# Patient Record
Sex: MALE | Race: WHITE
[De-identification: names, ages, dates, MRNs, and addresses within clinical notes are randomized per-mention and may not be internally consistent; named-entity substitution may affect disease eponyms.]

---

## 2018-09-20 ENCOUNTER — HOSPITAL ENCOUNTER (EMERGENCY)
Dept: HOSPITAL 62 - ER | Age: 28
Discharge: HOME | End: 2018-09-20
Payer: SELF-PAY

## 2018-09-20 VITALS — DIASTOLIC BLOOD PRESSURE: 73 MMHG | SYSTOLIC BLOOD PRESSURE: 121 MMHG

## 2018-09-20 DIAGNOSIS — L23.7: Primary | ICD-10-CM

## 2018-09-20 DIAGNOSIS — Z87.891: ICD-10-CM

## 2018-09-20 DIAGNOSIS — M79.604: ICD-10-CM

## 2018-09-20 DIAGNOSIS — J45.909: ICD-10-CM

## 2018-09-20 DIAGNOSIS — L03.115: ICD-10-CM

## 2018-09-20 PROCEDURE — 99283 EMERGENCY DEPT VISIT LOW MDM: CPT

## 2018-09-20 PROCEDURE — 96372 THER/PROPH/DIAG INJ SC/IM: CPT

## 2018-09-20 PROCEDURE — 96374 THER/PROPH/DIAG INJ IV PUSH: CPT

## 2018-09-20 NOTE — ER DOCUMENT REPORT
ED Extremity Problem, Lower





- General


Chief Complaint: Leg Pain


Stated Complaint: LEG PAIN


Time Seen by Provider: 09/20/18 11:38


Mode of Arrival: Ambulatory


Information source: Patient


Notes: 





Chief complaint: Right lower leg wound








History of complain:( obtained from----patient) 28 years old male who was being 

treated for poison ivy for the last 4 days with prednisolone, with a widespread 

rash, presents today with right lower leg rash which is oozing serosanguineous 

fluid and is warm and tender to touch.  Suspecting it is infected.


Denies any fever chills or other constitutional symptoms








Onset: Gradual


Duration: 45 days


Severity: Moderate


Quality: Itchy


Context: Poison ivy


Exacerbating factor and relieving factors: None











REVIEW OF SYSTEMS:


CONSTITUTIONAL :  Denies fever,  chills, or sweats.  Denies recent illness.


EENT:   Denies eye, ear, throat, or mouth pain or symptoms.  Denies nasal or 

sinus congestion or discharge.  Denies throat, tongue, or mouth swelling or 

difficulty swallowing.


CARDIOVASCULAR:  Denies chest pain.  Denies palpitations or racing or irregular 

heart beat.  Denies ankle edema.


RESPIRATORY:  Denies cough, cold, or chest congestion.  Denies shortness of 

breath, difficulty breathing, or wheezing.


GASTROINTESTINAL:  Denies  distention.  Denies nausea, vomiting, or diarrhea.  

Denies blood in vomitus, stools, or per rectum.  Denies black, tarry stools.  

Denies constipation. 


GENITOURINARY:  Denies difficulty urinating, painful urination, burning, 

frequency, blood in urine, or discharge.


FEMALE  GENITOURINARY:  Denies vaginal bleeding, heavy or abnormal periods, 

irregular periods.  Denies vaginal discharge or odor. 


MUSCULOSKELETAL:  Denies back or neck pain or stiffness.  Denies joint pain or 

swelling.


SKIN:   


HEMATOLOGIC :   Denies easy bruising or bleeding.


LYMPHATIC:  Denies swollen, enlarged glands.


NEUROLOGICAL:  Denies confusion or altered mental status.  Denies passing out 

or loss of consciousness.  Denies dizziness or lightheadedness.  Denies 

headache.  Denies weakness or paralysis or loss of use of either side.  Denies 

problems with gait or speech.  Denies sensory loss, numbness, or tingling.  

Denies seizures.


PSYCHIATRIC:  Denies anxiety or stress.  Denies depression, suicidal ideation, 

or homicidal ideation.





ALL OTHER SYSTEMS REVIEWED AND NEGATIVE.











PHYSICAL EXAMINATION:





GENERAL: Well-appearing, well-nourished and in no acute distress.  Morbid 

obesity





HEAD: Atraumatic, normocephalic.





EYES: Pupils equal round and reactive to light, extraocular movements intact, 

conjunctiva are normal.





ENT: Nares patent, oropharynx clear without exudates.  Moist mucous membranes.





NECK: Normal range of motion, supple without lymphadenopathy





LUNGS: Breath sounds clear to auscultation bilaterally and equal.  No wheezes 

rales or rhonchi.





HEART: Regular rate and rhythm without murmurs





ABDOMEN: Soft, nontender, nondistended abdomen.  No guarding, no rebound.  No 

masses appreciated.





Examination of genitals-deferred





Musculoskeletal: Normal range of motion, no pitting or edema.  No cyanosis.





NEUROLOGICAL: Cranial nerves grossly intact.  Normal speech, normal gait.  

Normal sensory, motor exams





PSYCH: Normal mood, normal affect.





SKIN: Diffuse scaly erythematous rash noted throughout the whole body in crops.

  Right lower leg lateral side of the area off 8" x 6" erythema is warm and 

tender to touch with oozing of serosanguineous fluid.

















Dictation was performed using Dragon voice recognition software


TRAVEL OUTSIDE OF THE [place] IN LAST 30 DAYS: No





- HPI


Notes: 





Dictated





- Related Data


Allergies/Adverse Reactions: 


 





No Known Allergies Allergy (Verified 09/20/18 11:09)


 











Past Medical History





- Social History


Smoking Status: Former Smoker


Chew tobacco use (# tins/day): No


Frequency of alcohol use: None


Drug Abuse: None


Family History: Reviewed & Not Pertinent


Patient has suicidal ideation: No


Patient has homicidal ideation: No


Pulmonary Medical History: Reports: Hx Asthma - as child


Renal/ Medical History: Denies: Hx Peritoneal Dialysis


Past Surgical History: Reports: Hx Tonsillectomy





- Immunizations


Hx Diphtheria, Pertussis, Tetanus Vaccination: Yes





Review of Systems





- Review of Systems


Notes: 





Dictated





Physical Exam





- Vital signs


Vitals: 





 











Temp Pulse Resp BP Pulse Ox


 


 97.9 F   99   15   121/73   97 


 


 09/20/18 11:14  09/20/18 11:14  09/20/18 11:14  09/20/18 11:14  09/20/18 11:14














- Notes


Notes: 





Dictated





Course





- Re-evaluation


Re-evalutation: 





09/20/18 11:47


Given Solu-Medrol, and ceftriaxone





- Vital Signs


Vital signs: 





 











Temp Pulse Resp BP Pulse Ox


 


 97.9 F   99   15   121/73   97 


 


 09/20/18 11:14  09/20/18 11:14  09/20/18 11:14  09/20/18 11:14  09/20/18 11:14














Discharge





- Discharge


Clinical Impression: 


 Poison ivy dermatitis





Cellulitis


Qualifiers:


 Site of cellulitis: extremity Site of cellulitis of extremity: lower extremity 

Laterality: right Qualified Code(s): L03.115 - Cellulitis of right lower limb





Condition: Fair


Disposition: HOME, SELF-CARE


Instructions:  Contact Dermatitis ([facility]), Cellulitis ([facility])


Prescriptions: 


Cephalexin Monohydrate [Keflex 500 mg Capsule] 500 mg PO QID #20 capsule


Prednisone [Sterapred Ds] 1 pkg PO ASDIR PRN 12 Days  [email or web address].[email or web address]


 PRN Reason: